# Patient Record
Sex: FEMALE | Race: WHITE | Employment: FULL TIME | URBAN - METROPOLITAN AREA
[De-identification: names, ages, dates, MRNs, and addresses within clinical notes are randomized per-mention and may not be internally consistent; named-entity substitution may affect disease eponyms.]

---

## 2022-05-02 ENCOUNTER — OFFICE VISIT (OUTPATIENT)
Dept: URGENT CARE | Facility: URGENT CARE | Age: 27
End: 2022-05-02

## 2022-05-02 ENCOUNTER — ANCILLARY PROCEDURE (OUTPATIENT)
Dept: GENERAL RADIOLOGY | Facility: CLINIC | Age: 27
End: 2022-05-02
Attending: NURSE PRACTITIONER

## 2022-05-02 VITALS
HEART RATE: 95 BPM | WEIGHT: 184 LBS | DIASTOLIC BLOOD PRESSURE: 76 MMHG | TEMPERATURE: 98.7 F | SYSTOLIC BLOOD PRESSURE: 132 MMHG | OXYGEN SATURATION: 98 %

## 2022-05-02 DIAGNOSIS — S99.912A ANKLE INJURY, LEFT, INITIAL ENCOUNTER: ICD-10-CM

## 2022-05-02 DIAGNOSIS — S93.402A SPRAIN OF LEFT ANKLE, UNSPECIFIED LIGAMENT, INITIAL ENCOUNTER: Primary | ICD-10-CM
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
## 2022-05-02 PROCEDURE — 99203 OFFICE O/P NEW LOW 30 MIN: CPT | Performed by: NURSE PRACTITIONER

## 2022-05-02 PROCEDURE — 73610 X-RAY EXAM OF ANKLE: CPT | Mod: TC | Performed by: RADIOLOGY

## 2022-05-02 RX ORDER — LEVONORGESTREL AND ETHINYL ESTRADIOL 0.1-0.02MG
1 KIT ORAL DAILY
COMMUNITY
Start: 2022-04-13

## 2022-05-02 NOTE — PROGRESS NOTES
Assessment & Plan     Sprain of left ankle, unspecified ligament, initial encounter    Ankle injury, left, initial encounter    - XR Ankle Left G/E 3 Views    Work related injury       Reviewed xray images and results during visit showing no obvious fracture or dislocation. Discussed likely lateral ankle sprain and recommended RICE: rest, ice, compress, elevate. Ibuprofen 600 mg every 8 hours with food for the next 7-10 days recommended, tylenol as needed. Continue own crutches as needed with weight bearing as tolerated and she and is fitted with ACE wrap. Gentle ROM once swelling decreases recommended. Discussed gradual healing expected over the next couple weeks and full healing in about 12 weeks.     Follow-up with PCP if symptoms persist for 7 days, and sooner if symptoms worsen or new symptoms develop.     Discussed red flag symptoms which warrant immediate visit in emergency room    All questions were answered and patient verbalized understanding. AVS reviewed with patient.     Evelin Mcclure, DNP, APRN, CNP 5/2/2022 6:26 PM  [facility]        Clay Owen is a 26 year old female who presents to clinic today for the following health issues:  Chief Complaint   Patient presents with     Musculoskeletal Problem     Happened about one hour ago, wet down slide with kid in lap, shoe caught on the slide heard a crack. Left foot top of the foot toward the left side. Starting to bruise and swell pain is mild     MS Injury/Pain    Onset of symptoms was 2 hour(s) ago.  Location: left foot  Context:  The injury happened while her foot got stuck on a slide while going down bending backwards  Course of symptoms is improving.    Severity mild  Current and Associated symptoms: Pain, Swelling, Tenderness and Decreased range of motion  Denies  Bruising, Warmth and Redness  Aggravating Factors: walking and weight-bearing  Therapies to improve symptoms include: elevation and wearing a brace  helps temporarily along with tylenol and ibuprofen  This is the first time this type of problem has occurred for this patient.       Employer: [employer]  Job title: Teacher  Date of injury: 5/2/22  Time of injury: 3:45pm     Problem list, Medication list, Allergies, and Medical history reviewed in EPIC.    ROS:  Review of systems negative except for noted above        Objective    /76   Pulse 95   Temp 98.7  F (37.1  C)   Wt 83.5 kg (184 lb)   SpO2 98%   Physical Exam  Constitutional:       General: She is not in acute distress.     Appearance: She is not toxic-appearing or diaphoretic.   Cardiovascular:      Pulses: Normal pulses.   Musculoskeletal:      Left lower leg: Normal.      Left ankle: Swelling present. Tenderness present over the lateral malleolus. No medial malleolus tenderness. Decreased range of motion.      Left foot: Normal.      Comments: Tenderness with palpation around lateral malleolus with mild swelling, decreased ROM inversion   Skin:     General: Skin is warm and dry.      Findings: No bruising or erythema.   Neurological:      Mental Status: She is alert.      Sensory: No sensory deficit.      Gait: Gait abnormal.      Comments: Using crutches non-weight bearing on left foot        X-ray left ankle was performed and reviewed independently by myself showing no obvious fracture or dislocation  Radiologist impression:   Results for orders placed or performed in visit on 05/02/22   XR Ankle Left G/E 3 Views     Status: None    Narrative    EXAM: XR ANKLE LEFT G/E 3 VIEWS  LOCATION: [facility]  DATE/TIME: 5/2/2022 6:00 PM    INDICATION: left lateral ankle pain after getting caught on slide  COMPARISON: None.      Impression    IMPRESSION: Normal joint spaces and alignment. No fracture. The ankle mortise is congruent. The talar dome is unremarkable.